# Patient Record
Sex: FEMALE | Race: WHITE | HISPANIC OR LATINO | Employment: FULL TIME | URBAN - METROPOLITAN AREA
[De-identification: names, ages, dates, MRNs, and addresses within clinical notes are randomized per-mention and may not be internally consistent; named-entity substitution may affect disease eponyms.]

---

## 2019-11-23 ENCOUNTER — APPOINTMENT (EMERGENCY)
Dept: RADIOLOGY | Facility: HOSPITAL | Age: 24
End: 2019-11-23
Payer: COMMERCIAL

## 2019-11-23 ENCOUNTER — HOSPITAL ENCOUNTER (EMERGENCY)
Facility: HOSPITAL | Age: 24
Discharge: HOME | End: 2019-11-23
Attending: EMERGENCY MEDICINE
Payer: COMMERCIAL

## 2019-11-23 VITALS
OXYGEN SATURATION: 99 % | DIASTOLIC BLOOD PRESSURE: 74 MMHG | HEART RATE: 78 BPM | WEIGHT: 198.19 LBS | RESPIRATION RATE: 16 BRPM | SYSTOLIC BLOOD PRESSURE: 111 MMHG | TEMPERATURE: 99 F

## 2019-11-23 DIAGNOSIS — R07.9 CHEST PAIN, UNSPECIFIED TYPE: Primary | ICD-10-CM

## 2019-11-23 DIAGNOSIS — R10.13 EPIGASTRIC PAIN: ICD-10-CM

## 2019-11-23 LAB
ALBUMIN SERPL BCP-MCNC: 3.5 G/DL (ref 3.5–5)
ALP SERPL-CCNC: 54 U/L (ref 46–116)
ALT SERPL W P-5'-P-CCNC: 15 U/L (ref 12–78)
ANION GAP SERPL CALCULATED.3IONS-SCNC: 3 MMOL/L (ref 4–13)
AST SERPL W P-5'-P-CCNC: 23 U/L (ref 5–45)
BACTERIA UR QL AUTO: ABNORMAL /HPF
BASOPHILS # BLD AUTO: 0.07 THOUSANDS/ΜL (ref 0–0.1)
BASOPHILS NFR BLD AUTO: 1 % (ref 0–1)
BILIRUB SERPL-MCNC: 0.25 MG/DL (ref 0.2–1)
BILIRUB UR QL STRIP: NEGATIVE
BUN SERPL-MCNC: 8 MG/DL (ref 5–25)
CALCIUM SERPL-MCNC: 8.7 MG/DL (ref 8.3–10.1)
CHLORIDE SERPL-SCNC: 104 MMOL/L (ref 100–108)
CLARITY UR: CLEAR
CO2 SERPL-SCNC: 29 MMOL/L (ref 21–32)
COLOR UR: YELLOW
CREAT SERPL-MCNC: 0.5 MG/DL (ref 0.6–1.3)
EOSINOPHIL # BLD AUTO: 0.13 THOUSAND/ΜL (ref 0–0.61)
EOSINOPHIL NFR BLD AUTO: 1 % (ref 0–6)
ERYTHROCYTE [DISTWIDTH] IN BLOOD BY AUTOMATED COUNT: 12.5 % (ref 11.6–15.1)
EXT PREG TEST URINE: NEGATIVE
EXT. CONTROL ED NAV: NORMAL
GFR SERPL CREATININE-BSD FRML MDRD: 136 ML/MIN/1.73SQ M
GLUCOSE SERPL-MCNC: 93 MG/DL (ref 65–140)
GLUCOSE UR STRIP-MCNC: NEGATIVE MG/DL
HCT VFR BLD AUTO: 38.1 % (ref 34.8–46.1)
HGB BLD-MCNC: 12.4 G/DL (ref 11.5–15.4)
HGB UR QL STRIP.AUTO: ABNORMAL
IMM GRANULOCYTES # BLD AUTO: 0.04 THOUSAND/UL (ref 0–0.2)
IMM GRANULOCYTES NFR BLD AUTO: 0 % (ref 0–2)
KETONES UR STRIP-MCNC: NEGATIVE MG/DL
LEUKOCYTE ESTERASE UR QL STRIP: ABNORMAL
LIPASE SERPL-CCNC: 127 U/L (ref 73–393)
LYMPHOCYTES # BLD AUTO: 3.2 THOUSANDS/ΜL (ref 0.6–4.47)
LYMPHOCYTES NFR BLD AUTO: 27 % (ref 14–44)
MCH RBC QN AUTO: 29.3 PG (ref 26.8–34.3)
MCHC RBC AUTO-ENTMCNC: 32.5 G/DL (ref 31.4–37.4)
MCV RBC AUTO: 90 FL (ref 82–98)
MONOCYTES # BLD AUTO: 0.87 THOUSAND/ΜL (ref 0.17–1.22)
MONOCYTES NFR BLD AUTO: 7 % (ref 4–12)
NEUTROPHILS # BLD AUTO: 7.43 THOUSANDS/ΜL (ref 1.85–7.62)
NEUTS SEG NFR BLD AUTO: 64 % (ref 43–75)
NITRITE UR QL STRIP: NEGATIVE
NON-SQ EPI CELLS URNS QL MICRO: ABNORMAL /HPF
NRBC BLD AUTO-RTO: 0 /100 WBCS
PH UR STRIP.AUTO: 7.5 [PH] (ref 4.5–8)
PLATELET # BLD AUTO: 264 THOUSANDS/UL (ref 149–390)
PMV BLD AUTO: 10.7 FL (ref 8.9–12.7)
POTASSIUM SERPL-SCNC: 4.8 MMOL/L (ref 3.5–5.3)
PROT SERPL-MCNC: 7.2 G/DL (ref 6.4–8.2)
PROT UR STRIP-MCNC: NEGATIVE MG/DL
RBC # BLD AUTO: 4.23 MILLION/UL (ref 3.81–5.12)
RBC #/AREA URNS AUTO: ABNORMAL /HPF
SODIUM SERPL-SCNC: 136 MMOL/L (ref 136–145)
SP GR UR STRIP.AUTO: 1.01 (ref 1–1.03)
UROBILINOGEN UR QL STRIP.AUTO: 0.2 E.U./DL
WBC # BLD AUTO: 11.74 THOUSAND/UL (ref 4.31–10.16)
WBC #/AREA URNS AUTO: ABNORMAL /HPF

## 2019-11-23 PROCEDURE — 80053 COMPREHEN METABOLIC PANEL: CPT | Performed by: EMERGENCY MEDICINE

## 2019-11-23 PROCEDURE — 85025 COMPLETE CBC W/AUTO DIFF WBC: CPT | Performed by: EMERGENCY MEDICINE

## 2019-11-23 PROCEDURE — 81025 URINE PREGNANCY TEST: CPT | Performed by: EMERGENCY MEDICINE

## 2019-11-23 PROCEDURE — 99284 EMERGENCY DEPT VISIT MOD MDM: CPT | Performed by: EMERGENCY MEDICINE

## 2019-11-23 PROCEDURE — 93005 ELECTROCARDIOGRAM TRACING: CPT

## 2019-11-23 PROCEDURE — 71046 X-RAY EXAM CHEST 2 VIEWS: CPT

## 2019-11-23 PROCEDURE — 81001 URINALYSIS AUTO W/SCOPE: CPT

## 2019-11-23 PROCEDURE — 83690 ASSAY OF LIPASE: CPT | Performed by: EMERGENCY MEDICINE

## 2019-11-23 PROCEDURE — 36415 COLL VENOUS BLD VENIPUNCTURE: CPT | Performed by: EMERGENCY MEDICINE

## 2019-11-23 PROCEDURE — 99285 EMERGENCY DEPT VISIT HI MDM: CPT

## 2019-11-23 RX ORDER — FAMOTIDINE 20 MG/1
20 TABLET, FILM COATED ORAL 2 TIMES DAILY
Qty: 20 TABLET | Refills: 0 | Status: SHIPPED | OUTPATIENT
Start: 2019-11-23 | End: 2019-12-03

## 2019-11-23 RX ORDER — MAGNESIUM HYDROXIDE/ALUMINUM HYDROXICE/SIMETHICONE 120; 1200; 1200 MG/30ML; MG/30ML; MG/30ML
30 SUSPENSION ORAL ONCE
Status: COMPLETED | OUTPATIENT
Start: 2019-11-23 | End: 2019-11-23

## 2019-11-23 RX ADMIN — ALUMINUM HYDROXIDE, MAGNESIUM HYDROXIDE, AND SIMETHICONE 30 ML: 200; 200; 20 SUSPENSION ORAL at 20:42

## 2019-11-24 LAB
ATRIAL RATE: 86 BPM
P AXIS: 64 DEGREES
PR INTERVAL: 170 MS
QRS AXIS: 68 DEGREES
QRSD INTERVAL: 78 MS
QT INTERVAL: 338 MS
QTC INTERVAL: 404 MS
T WAVE AXIS: 54 DEGREES
VENTRICULAR RATE: 86 BPM

## 2019-11-24 PROCEDURE — 93010 ELECTROCARDIOGRAM REPORT: CPT | Performed by: INTERNAL MEDICINE

## 2019-11-24 NOTE — ED PROVIDER NOTES
History  Chief Complaint   Patient presents with    Abdominal Pain     pt c/o upper abdominal pain and chest pain intermittent since this morning  denies n/v   states hard to breath  denies sick contacts   Chest Pain     80-year-old female with no pertinent past medical history presenting to the ED stating that she has been having intermittent chest pain for last 3 months and the reason she has come in today is because she is having some epigastric pain associated with it which he has not had before  Patient states she has mild constipation for which he is taking MiraLax  Patient denies fever, aches, chills, sore throat, cough, vomiting, diarrhea constipation dysuria, hematuria  Denies recent long plane ride/heart/bus rides, recent chemotherapy, surgery, hormone therapy, history of DVTs, a family history of blood clots, family history of heart disease  None       History reviewed  No pertinent past medical history  History reviewed  No pertinent surgical history  History reviewed  No pertinent family history  I have reviewed and agree with the history as documented  Social History     Tobacco Use    Smoking status: Never Smoker    Smokeless tobacco: Never Used   Substance Use Topics    Alcohol use: Never     Frequency: Never    Drug use: Yes     Types: Marijuana        Review of Systems   Constitutional: Negative  HENT: Negative  Eyes: Negative for photophobia and visual disturbance  Respiratory: Positive for chest tightness and shortness of breath  Negative for apnea, cough, choking, wheezing and stridor  Cardiovascular: Positive for chest pain  Negative for palpitations and leg swelling  Gastrointestinal: Positive for nausea  Negative for abdominal distention, abdominal pain, constipation, diarrhea and vomiting  Genitourinary: Negative  Musculoskeletal: Negative  Skin: Negative  Neurological: Negative          Physical Exam  ED Triage Vitals   Temperature Pulse Respirations Blood Pressure SpO2   11/23/19 1807 11/23/19 1807 11/23/19 1807 11/23/19 1807 11/23/19 1807   99 °F (37 2 °C) 88 16 125/70 99 %      Temp Source Heart Rate Source Patient Position - Orthostatic VS BP Location FiO2 (%)   11/23/19 1807 11/23/19 2018 11/23/19 2018 11/23/19 2018 --   Temporal Monitor Lying Left arm       Pain Score       11/23/19 1807       8             Orthostatic Vital Signs  Vitals:    11/23/19 1807 11/23/19 2018   BP: 125/70 111/74   Pulse: 88 78   Patient Position - Orthostatic VS:  Lying       Physical Exam   Constitutional: She is oriented to person, place, and time  She appears well-developed and well-nourished  Non-toxic appearance  She does not appear ill  No distress  HENT:   Head: Normocephalic and atraumatic  Right Ear: External ear normal    Left Ear: External ear normal    Nose: Nose normal    Mouth/Throat: Oropharynx is clear and moist  No oropharyngeal exudate  Eyes: Pupils are equal, round, and reactive to light  Conjunctivae and EOM are normal  Right eye exhibits no discharge  Left eye exhibits no discharge  No scleral icterus  Neck: Normal range of motion  Neck supple  No JVD present  No tracheal deviation present  Cardiovascular: Normal rate, regular rhythm, normal heart sounds and intact distal pulses  Exam reveals no gallop and no friction rub  No murmur heard  Pulmonary/Chest: Effort normal and breath sounds normal  No stridor  No respiratory distress  She has no wheezes  She has no rales  Abdominal: Soft  Normal appearance and bowel sounds are normal  She exhibits no distension and no mass  There is no hepatosplenomegaly  There is no tenderness  There is no rigidity, no rebound, no guarding, no CVA tenderness, no tenderness at McBurney's point and negative Hoff's sign  Musculoskeletal: Normal range of motion  She exhibits no edema, tenderness or deformity  Neurological: She is alert and oriented to person, place, and time   No cranial nerve deficit or sensory deficit  She exhibits normal muscle tone  Skin: Skin is warm and dry  No rash noted  She is not diaphoretic  No erythema  No pallor  Psychiatric: She has a normal mood and affect  Her behavior is normal  Judgment and thought content normal    Nursing note and vitals reviewed        ED Medications  Medications   aluminum-magnesium hydroxide-simethicone (MYLANTA) 200-200-20 mg/5 mL oral suspension 30 mL (30 mL Oral Given 11/23/19 2042)       Diagnostic Studies  Results Reviewed     Procedure Component Value Units Date/Time    Comprehensive metabolic panel [[id number]]  (Abnormal) Collected:  11/23/19 1955    Lab Status:  Final result Specimen:  Blood from Arm, Left Updated:  11/23/19 2017     Sodium 136 mmol/L      Potassium 4 8 mmol/L      Chloride 104 mmol/L      CO2 29 mmol/L      ANION GAP 3 mmol/L      BUN 8 mg/dL      Creatinine 0 50 mg/dL      Glucose 93 mg/dL      Calcium 8 7 mg/dL      AST 23 U/L      ALT 15 U/L      Alkaline Phosphatase 54 U/L      Total Protein 7 2 g/dL      Albumin 3 5 g/dL      Total Bilirubin 0 25 mg/dL      eGFR 136 ml/min/1 73sq m     Narrative:       [place] guidelines for Chronic Kidney Disease (CKD):     Stage 1 with normal or high GFR (GFR > 90 mL/min/1 73 square meters)    Stage 2 Mild CKD (GFR = 60-89 mL/min/1 73 square meters)    Stage 3A Moderate CKD (GFR = 45-59 mL/min/1 73 square meters)    Stage 3B Moderate CKD (GFR = 30-44 mL/min/1 73 square meters)    Stage 4 Severe CKD (GFR = 15-29 mL/min/1 73 square meters)    Stage 5 End Stage CKD (GFR <15 mL/min/1 73 square meters)  Note: GFR calculation is accurate only with a steady state creatinine    Lipase [[id number]]  (Normal) Collected:  11/23/19 1955    Lab Status:  Final result Specimen:  Blood from Arm, Left Updated:  11/23/19 2017     Lipase 127 u/L     CBC and differential [[id number]]  (Abnormal) Collected:  11/23/19 1955    Lab Status:  Final result Specimen: Blood from Arm, Left Updated:  11/23/19 2003     WBC 11 74 Thousand/uL      RBC 4 23 Million/uL      Hemoglobin 12 4 g/dL      Hematocrit 38 1 %      MCV 90 fL      MCH 29 3 pg      MCHC 32 5 g/dL      RDW 12 5 %      MPV 10 7 fL      Platelets 127 Thousands/uL      nRBC 0 /100 WBCs      Neutrophils Relative 64 %      Immat GRANS % 0 %      Lymphocytes Relative 27 %      Monocytes Relative 7 %      Eosinophils Relative 1 %      Basophils Relative 1 %      Neutrophils Absolute 7 43 Thousands/µL      Immature Grans Absolute 0 04 Thousand/uL      Lymphocytes Absolute 3 20 Thousands/µL      Monocytes Absolute 0 87 Thousand/µL      Eosinophils Absolute 0 13 Thousand/µL      Basophils Absolute 0 07 Thousands/µL     Urine Microscopic [[id number]]  (Abnormal) Collected:  11/23/19 1831    Lab Status:  Final result Specimen:  Urine, Clean Catch Updated:  11/23/19 1907     RBC, UA None Seen /hpf      WBC, UA 4-10 /hpf      Epithelial Cells Occasional /hpf      Bacteria, UA Occasional /hpf     POCT urinalysis dipstick [[id number]]  (Abnormal) Resulted:  11/23/19 1836    Lab Status:  Final result Updated:  11/23/19 1836    POCT pregnancy, urine [[id number]]  (Normal) Resulted:  11/23/19 1835    Lab Status:  Final result Updated:  11/23/19 1836     EXT PREG TEST UR (Ref: Negative) negative     Control valid    Urine Macroscopic, POC [[id number]]  (Abnormal) Collected:  11/23/19 1831    Lab Status:  Final result Specimen:  Urine Updated:  11/23/19 1832     Color, UA Yellow     Clarity, UA Clear     pH, UA 7 5     Leukocytes, UA Small     Nitrite, UA Negative     Protein, UA Negative mg/dl      Glucose, UA Negative mg/dl      Ketones, UA Negative mg/dl      Urobilinogen, UA 0 2 E U /dl      Bilirubin, UA Negative     Blood, UA Trace     Specific [place], UA 1 015    Narrative:       CLINITEK RESULT                 XR chest pa & lateral    (Results Pending)         Procedures  ECG 12 Lead Documentation Only  Date/Time: 11/23/2019 8:18 PM  Performed by: Nayely García DO  Authorized by: Nayely García DO     Indications / Diagnosis:  CP  ECG reviewed by me, the ED Provider: yes    Patient location:  ED  Previous ECG:     Previous ECG:  Unavailable    Comparison to cardiac monitor: Yes    Interpretation:     Interpretation: normal    Rate:     ECG rate:  86    ECG rate assessment: normal    Rhythm:     Rhythm: sinus rhythm    Ectopy:     Ectopy: none    QRS:     QRS axis:  Normal    QRS intervals:  Normal  Conduction:     Conduction: normal    ST segments:     ST segments:  Normal  T waves:     T waves: normal              ED Course  ED Course as of Nov 23 2115   Sat Nov 23, 2019 2109 Feels better              HEART Risk Score      Most Recent Value   History  0 Filed at: 11/23/2019 2108   ECG  0 Filed at: 11/23/2019 2108   Age  0 Filed at: 11/23/2019 2108   Risk Factors  0 Filed at: 11/23/2019 2108   Troponin  0 Filed at: 11/23/2019 2108   Heart Score Risk Calculator   History  0 Filed at: 11/23/2019 2108   ECG  0 Filed at: 11/23/2019 2108   Age  0 Filed at: 11/23/2019 2108   Risk Factors  0 Filed at: 11/23/2019 2108   Troponin  0 Filed at: 11/23/2019 2108   HEART Score  0 Filed at: 11/23/2019 2108   HEART Score  0 Filed at: 11/23/2019 2108            PERC Rule for PE      Most Recent Value   PERC Rule for PE   Age >=50  0 Filed at: 11/23/2019 2108   HR >=100  0 Filed at: 11/23/2019 2108   O2 Sat on room air < 95%  0 Filed at: 11/23/2019 2108   History of PE or DVT  0 Filed at: 11/23/2019 2108   Recent trauma or surgery  0 Filed at: 11/23/2019 2108   Hemoptysis  0 Filed at: 11/23/2019 2108   Exogenous estrogen  0 Filed at: 11/23/2019 2108   Unilateral leg swelling  0 Filed at: 11/23/2019 2108   [address]  Rule for PE Results  0 Filed at: 11/23/2019 2108                      MDM      Disposition  Final diagnoses:   Chest pain, unspecified type   Epigastric pain     Time reflects when diagnosis was documented in both MDM as applicable and the Disposition within this note     Time User Action Codes Description Comment    11/23/2019  9:08 PM Joan Verde Add [R07 9] Chest pain, unspecified type     11/23/2019  9:08 PM Joan Verde Add [R10 13] Epigastric pain       ED Disposition     ED Disposition Condition Date/Time Comment    Discharge Stable Sat Nov 23, 2019  9:08 PM Yumiko Oden discharge to home/self care  Follow-up Information     Follow up With Specialties Details Why Contact Info Additional [facility] Family Medicine Go to   [address] [place] [id number]  [address] [place] [address],[address], [id number] [address] [facility] Emergency Department Emergency Medicine  As needed, If symptoms worsen [facility] [id number]  [place] [address] ED, [address] [place], [place], [address]          Patient's Medications   Discharge Prescriptions    FAMOTIDINE (PEPCID) 20 MG TABLET    Take 1 tablet (20 mg total) by mouth 2 (two) times a day for 10 days       Start Date: 11/23/2019End Date: 12/3/2019       Order Dose: 20 mg       Quantity: 20 tablet    Refills: 0     No discharge procedures on file  ED Provider  Attending physically available and evaluated Yumiko Oden I managed the patient along with the ED Attending      Electronically Signed by         Sue Mosquera DO  11/23/19 7410

## 2019-11-24 NOTE — ED ATTENDING ATTESTATION
11/23/2019  ILaura DO, saw and evaluated the patient  I have discussed the patient with the resident/non-physician practitioner and agree with the resident's/non-physician practitioner's findings, Plan of Care, and MDM as documented in the resident's/non-physician practitioner's note, except where noted  All available labs and Radiology studies were reviewed  I was present for key portions of any procedure(s) performed by the resident/non-physician practitioner and I was immediately available to provide assistance  At this point I agree with the current assessment done in the Emergency Department  I have conducted an independent evaluation of this patient a history and physical is as follows:    ED Course     25 y o  F p/w chest tightness x 3 months  Centralized chest tightness  Came in today because now she's having epigastric burning after eating breakfast  Also having "a little bit of constipation "  Took Miralax without relief  Denies fevers, cough, N/V/D, urinary complaints  No fhx cardiac problems  Pt in NAD on exam   Lungs CTAB  Heart RRR  Abdomen is nontender  Plan: Labs, CXR, EKG, symptomatic tx      Critical Care Time  Procedures

## 2021-12-30 ENCOUNTER — OFFICE VISIT (OUTPATIENT)
Dept: URGENT CARE | Age: 26
End: 2021-12-30
Payer: COMMERCIAL

## 2021-12-30 VITALS — TEMPERATURE: 100.2 F | WEIGHT: 230 LBS | HEART RATE: 128 BPM | OXYGEN SATURATION: 99 %

## 2021-12-30 DIAGNOSIS — J06.9 VIRAL URI: Primary | ICD-10-CM

## 2021-12-30 PROCEDURE — 99213 OFFICE O/P EST LOW 20 MIN: CPT | Performed by: FAMILY MEDICINE

## 2021-12-30 PROCEDURE — 87636 SARSCOV2 & INF A&B AMP PRB: CPT | Performed by: FAMILY MEDICINE

## 2021-12-30 RX ORDER — COPPER 313.4 MG/1
1 INTRAUTERINE DEVICE INTRAUTERINE
COMMUNITY

## 2022-01-04 LAB
FLUAV RNA RESP QL NAA+PROBE: NEGATIVE
FLUBV RNA RESP QL NAA+PROBE: NEGATIVE
SARS-COV-2 RNA RESP QL NAA+PROBE: POSITIVE

## 2022-01-05 ENCOUNTER — TELEPHONE (OUTPATIENT)
Dept: OTHER | Facility: OTHER | Age: 27
End: 2022-01-05

## 2022-09-28 ENCOUNTER — OFFICE VISIT (OUTPATIENT)
Dept: URGENT CARE | Age: 27
End: 2022-09-28
Payer: COMMERCIAL

## 2022-09-28 VITALS
RESPIRATION RATE: 20 BRPM | HEIGHT: 63 IN | BODY MASS INDEX: 41.36 KG/M2 | WEIGHT: 233.4 LBS | OXYGEN SATURATION: 97 % | SYSTOLIC BLOOD PRESSURE: 122 MMHG | HEART RATE: 103 BPM | TEMPERATURE: 99.2 F | DIASTOLIC BLOOD PRESSURE: 86 MMHG

## 2022-09-28 DIAGNOSIS — R09.82 POST-NASAL DRIP: ICD-10-CM

## 2022-09-28 DIAGNOSIS — J32.9 VIRAL SINUSITIS: ICD-10-CM

## 2022-09-28 DIAGNOSIS — R11.2 NAUSEA AND VOMITING, UNSPECIFIED VOMITING TYPE: Primary | ICD-10-CM

## 2022-09-28 DIAGNOSIS — R53.83 OTHER FATIGUE: ICD-10-CM

## 2022-09-28 DIAGNOSIS — B97.89 VIRAL SINUSITIS: ICD-10-CM

## 2022-09-28 LAB
SARS-COV-2 AG UPPER RESP QL IA: NEGATIVE
VALID CONTROL: NORMAL

## 2022-09-28 PROCEDURE — 99213 OFFICE O/P EST LOW 20 MIN: CPT | Performed by: NURSE PRACTITIONER

## 2022-09-28 PROCEDURE — 87811 SARS-COV-2 COVID19 W/OPTIC: CPT | Performed by: NURSE PRACTITIONER

## 2022-09-28 RX ORDER — ONDANSETRON 4 MG/1
4 TABLET, ORALLY DISINTEGRATING ORAL EVERY 8 HOURS PRN
Qty: 12 TABLET | Refills: 0 | Status: SHIPPED | OUTPATIENT
Start: 2022-09-28 | End: 2022-09-29

## 2022-09-28 NOTE — LETTER
September 28, 2022     Patient: General Hunt  YOB: 1995  Date of Visit: 9/28/2022      To Whom it May Concern:    General Hunt is under my professional care  Loy Moya was seen in my office on 9/28/2022  Loy Moya may return to work on 09/29/2022 if symptoms improve, if not please excuse till the next working day              Sincerely,          RACIEL Blank        CC: No Recipients

## 2022-09-28 NOTE — PATIENT INSTRUCTIONS
Take zyrtec or allegra daily  Use flonase 1-2 sprays in each nare daily   Use nasal saline to the nose,   Use humidifer in room  Symptoms worsen go to ER  Rest    Covid test is negative today in the office    Aware to use zofran for the nausea     No bacterial infection present

## 2022-09-28 NOTE — PROGRESS NOTES
NAME: Tyler Harris is a 32 y o  female  : 1995    MRN: [id number]    /86   Pulse 103   Temp 99 2 °F (37 3 °C)   Resp 20   Ht 5' 3" (1 6 m)   Wt 106 kg (233 lb 6 4 oz)   LMP 2022 (Approximate)   SpO2 97%   BMI 41 34 kg/m²     Assessment and Plan   Nausea and vomiting, unspecified vomiting type [R11 2]  1  Nausea and vomiting, unspecified vomiting type  Poct Covid 19 Rapid Antigen Test    ondansetron (ZOFRAN-ODT) 4 mg disintegrating tablet   2  Viral sinusitis     3  Post-nasal drip     4  Other fatigue         Fredo Sotelo was seen today for cold like symptoms  Diagnoses and all orders for this visit:    Nausea and vomiting, unspecified vomiting type  -     Poct Covid 19 Rapid Antigen Test  -     ondansetron (ZOFRAN-ODT) 4 mg disintegrating tablet; Take 1 tablet (4 mg total) by mouth every 8 (eight) hours as needed for nausea or vomiting for up to 12 doses    Viral sinusitis    Post-nasal drip    Other fatigue        Patient Instructions   Patient Instructions   Take zyrtec or allegra daily  Use flonase 1-2 sprays in each nare daily   Use nasal saline to the nose,   Use humidifer in room  Symptoms worsen go to ER  Rest    Covid test is negative today in the office    Aware to use zofran for the nausea     No bacterial infection present      Proceed to the nearest ER if symptoms worsen, Follow up with your PCP  Continue to social distance, wash your hands, and wear your masks  Please continue to follow the CDC  gov guidelines daily for they are subject to change on COVID-19    Chief Complaint     Chief Complaint   Patient presents with    Cold Like Symptoms     Pt started Sat with nasal congestion and cough, yesterday with vomiting and last cristine with diarrhea, chills, sweats and weakness  Pt works in   No OTC meds taken  No rapid covid test performed, pt is unvaccinated             History of Present Illness     Patient is a 51-year-old female who is here today with fatigue, nausea vomiting, sore throat  Symptoms have been present for the past 4 days  She is eating nothing over-the-counter currently works in a  and is not vaccinated for COVID-19  She has not taken any home COVID test prior to arrival, denies fevers, and has used her albuterol inhaler twice  She has a history of asthma and typically has no shortness of breath or chest pain  Denies chest pain today as well  Denies abdominal cramping      Review of Systems   Review of Systems   Constitutional: Positive for fatigue  Negative for chills and fever  HENT: Positive for postnasal drip, sneezing and sore throat  Negative for congestion, ear pain, rhinorrhea, sinus pressure and sinus pain  Eyes: Negative  Respiratory: Positive for cough  Negative for shortness of breath  Cardiovascular: Negative  Gastrointestinal: Positive for diarrhea, nausea and vomiting  Genitourinary: Negative  Musculoskeletal: Negative  Skin: Negative  Neurological: Negative  Psychiatric/Behavioral: Negative  Current Medications       Current Outpatient Medications:     intrauterine copper (PARAGARD) IUD, 1 each by Intrauterine route, Disp: , Rfl:     ondansetron (ZOFRAN-ODT) 4 mg disintegrating tablet, Take 1 tablet (4 mg total) by mouth every 8 (eight) hours as needed for nausea or vomiting for up to 12 doses, Disp: 12 tablet, Rfl: 0    famotidine (PEPCID) 20 mg tablet, Take 1 tablet (20 mg total) by mouth 2 (two) times a day for 10 days, Disp: 20 tablet, Rfl: 0    Prenatal Multivit-Min-Fe-FA (PRENATAL 1 + IRON PO), Take 1 tablet by mouth daily (Patient not taking: Reported on 9/28/2022), Disp: , Rfl:     Current Allergies     Allergies as of 09/28/2022    (No Known Allergies)              History reviewed  No pertinent past medical history  History reviewed  No pertinent surgical history  History reviewed  No pertinent family history  Medications have been verified      The following portions of the patient's history were reviewed and updated as appropriate: allergies, current medications, past family history, past medical history, past social history, past surgical history and problem list     Objective   /86   Pulse 103   Temp 99 2 °F (37 3 °C)   Resp 20   Ht 5' 3" (1 6 m)   Wt 106 kg (233 lb 6 4 oz)   LMP 09/05/2022 (Approximate)   SpO2 97%   BMI 41 34 kg/m²      Physical Exam     Physical Exam  Constitutional:       General: She is awake  She is not in acute distress  Appearance: Normal appearance  She is well-developed  She is not ill-appearing  HENT:      Head: Normocephalic  Right Ear: Hearing, tympanic membrane, ear canal and external ear normal  There is no impacted cerumen  Left Ear: Hearing, tympanic membrane, ear canal and external ear normal  There is no impacted cerumen  Nose: Rhinorrhea present  No mucosal edema or congestion  Right Turbinates: Swollen and pale  Left Turbinates: Swollen and pale  Right Sinus: No maxillary sinus tenderness  Left Sinus: No maxillary sinus tenderness  Mouth/Throat:      Lips: Pink  Mouth: Mucous membranes are moist       Pharynx: Oropharynx is clear  Uvula midline  No pharyngeal swelling, oropharyngeal exudate, posterior oropharyngeal erythema or uvula swelling  Tonsils: No tonsillar exudate  Comments: Clear drainage in back of throat,  Eyes:      General: Lids are normal       Extraocular Movements: Extraocular movements intact  Pupils: Pupils are equal, round, and reactive to light  Cardiovascular:      Rate and Rhythm: Normal rate and regular rhythm  Heart sounds: Normal heart sounds  Pulmonary:      Effort: Pulmonary effort is normal       Breath sounds: Normal breath sounds and air entry  No decreased breath sounds, wheezing, rhonchi or rales  Skin:     General: Skin is warm  Capillary Refill: Capillary refill takes less than 2 seconds     Neurological:      General: No focal deficit present  Mental Status: She is alert and oriented to person, place, and time  Psychiatric:         Attention and Perception: Attention normal          Mood and Affect: Mood normal          Behavior: Behavior is cooperative  Note: Portions of this record may have been created with voice recognition software  Occasional wrong word or "sound a like" substitutions may have occurred due to the inherent limitations of voice recognition software  Please read the chart carefully and recognize, using context, where substitutions have occurred  RACIEL Ann

## 2022-09-28 NOTE — LETTER
September 28, 2022     Patient: Shiloh Ivan  YOB: 1995  Date of Visit: 9/28/2022      To Whom it May Concern:    Shiloh Ivan is under my professional care  Nayelyemily Dye was seen in my office on 9/28/2022  Nayely Dye may return to work on 9/28/2022  if symptoms improve, if not please excuse till the next working day  She tested negative for COVID today in the office              Sincerely,          RACIEL Shay        CC: No Recipients

## 2022-09-29 ENCOUNTER — APPOINTMENT (EMERGENCY)
Dept: CT IMAGING | Facility: HOSPITAL | Age: 27
End: 2022-09-29
Payer: COMMERCIAL

## 2022-09-29 ENCOUNTER — APPOINTMENT (OUTPATIENT)
Dept: RADIOLOGY | Facility: HOSPITAL | Age: 27
End: 2022-09-29
Payer: COMMERCIAL

## 2022-09-29 ENCOUNTER — HOSPITAL ENCOUNTER (EMERGENCY)
Facility: HOSPITAL | Age: 27
Discharge: HOME | End: 2022-09-29
Attending: EMERGENCY MEDICINE
Payer: COMMERCIAL

## 2022-09-29 VITALS
RESPIRATION RATE: 22 BRPM | HEART RATE: 95 BPM | DIASTOLIC BLOOD PRESSURE: 62 MMHG | TEMPERATURE: 99.6 F | OXYGEN SATURATION: 99 % | BODY MASS INDEX: 41.24 KG/M2 | WEIGHT: 232.81 LBS | SYSTOLIC BLOOD PRESSURE: 106 MMHG

## 2022-09-29 DIAGNOSIS — N39.0 UTI (URINARY TRACT INFECTION): ICD-10-CM

## 2022-09-29 DIAGNOSIS — K52.9 GASTROENTERITIS: ICD-10-CM

## 2022-09-29 DIAGNOSIS — D21.9 FIBROID: ICD-10-CM

## 2022-09-29 DIAGNOSIS — B34.9 ACUTE VIRAL SYNDROME: Primary | ICD-10-CM

## 2022-09-29 LAB
ALBUMIN SERPL BCP-MCNC: 3.5 G/DL (ref 3.5–5)
ALP SERPL-CCNC: 81 U/L (ref 46–116)
ALT SERPL W P-5'-P-CCNC: 16 U/L (ref 12–78)
ANION GAP SERPL CALCULATED.3IONS-SCNC: 7 MMOL/L (ref 4–13)
AST SERPL W P-5'-P-CCNC: 23 U/L (ref 5–45)
BACTERIA UR QL AUTO: ABNORMAL /HPF
BASOPHILS # BLD AUTO: 0.03 THOUSANDS/ΜL (ref 0–0.1)
BASOPHILS NFR BLD AUTO: 0 % (ref 0–1)
BILIRUB SERPL-MCNC: 0.22 MG/DL (ref 0.2–1)
BILIRUB UR QL STRIP: NEGATIVE
BUN SERPL-MCNC: 5 MG/DL (ref 5–25)
CALCIUM SERPL-MCNC: 8.8 MG/DL (ref 8.3–10.1)
CARDIAC TROPONIN I PNL SERPL HS: <2 NG/L
CARDIAC TROPONIN I PNL SERPL HS: <2 NG/L
CHLORIDE SERPL-SCNC: 99 MMOL/L (ref 96–108)
CLARITY UR: CLEAR
CO2 SERPL-SCNC: 29 MMOL/L (ref 21–32)
COLOR UR: YELLOW
CREAT SERPL-MCNC: 0.65 MG/DL (ref 0.6–1.3)
EOSINOPHIL # BLD AUTO: 0 THOUSAND/ΜL (ref 0–0.61)
EOSINOPHIL NFR BLD AUTO: 0 % (ref 0–6)
ERYTHROCYTE [DISTWIDTH] IN BLOOD BY AUTOMATED COUNT: 13.3 % (ref 11.6–15.1)
EXT PREG TEST URINE: NEGATIVE
EXT. CONTROL ED NAV: NORMAL
GFR SERPL CREATININE-BSD FRML MDRD: 122 ML/MIN/1.73SQ M
GLUCOSE SERPL-MCNC: 106 MG/DL (ref 65–140)
GLUCOSE UR STRIP-MCNC: NEGATIVE MG/DL
HCT VFR BLD AUTO: 43.3 % (ref 34.8–46.1)
HGB BLD-MCNC: 14 G/DL (ref 11.5–15.4)
HGB UR QL STRIP.AUTO: ABNORMAL
IMM GRANULOCYTES # BLD AUTO: 0.05 THOUSAND/UL (ref 0–0.2)
IMM GRANULOCYTES NFR BLD AUTO: 1 % (ref 0–2)
KETONES UR STRIP-MCNC: ABNORMAL MG/DL
LEUKOCYTE ESTERASE UR QL STRIP: ABNORMAL
LIPASE SERPL-CCNC: 80 U/L (ref 73–393)
LYMPHOCYTES # BLD AUTO: 0.93 THOUSANDS/ΜL (ref 0.6–4.47)
LYMPHOCYTES NFR BLD AUTO: 11 % (ref 14–44)
MCH RBC QN AUTO: 27.8 PG (ref 26.8–34.3)
MCHC RBC AUTO-ENTMCNC: 32.3 G/DL (ref 31.4–37.4)
MCV RBC AUTO: 86 FL (ref 82–98)
MONOCYTES # BLD AUTO: 0.55 THOUSAND/ΜL (ref 0.17–1.22)
MONOCYTES NFR BLD AUTO: 6 % (ref 4–12)
NEUTROPHILS # BLD AUTO: 7.13 THOUSANDS/ΜL (ref 1.85–7.62)
NEUTS SEG NFR BLD AUTO: 82 % (ref 43–75)
NITRITE UR QL STRIP: NEGATIVE
NON-SQ EPI CELLS URNS QL MICRO: ABNORMAL /HPF
NRBC BLD AUTO-RTO: 0 /100 WBCS
PH UR STRIP.AUTO: 6.5 [PH] (ref 4.5–8)
PLATELET # BLD AUTO: 282 THOUSANDS/UL (ref 149–390)
PMV BLD AUTO: 10.9 FL (ref 8.9–12.7)
POTASSIUM SERPL-SCNC: 4.1 MMOL/L (ref 3.5–5.3)
PROT SERPL-MCNC: 8.2 G/DL (ref 6.4–8.4)
PROT UR STRIP-MCNC: ABNORMAL MG/DL
RBC # BLD AUTO: 5.03 MILLION/UL (ref 3.81–5.12)
RBC #/AREA URNS AUTO: ABNORMAL /HPF
SARS-COV-2 RNA RESP QL NAA+PROBE: NEGATIVE
SODIUM SERPL-SCNC: 135 MMOL/L (ref 135–147)
SP GR UR STRIP.AUTO: 1.02 (ref 1–1.03)
UROBILINOGEN UR QL STRIP.AUTO: 0.2 E.U./DL
WBC # BLD AUTO: 8.69 THOUSAND/UL (ref 4.31–10.16)
WBC #/AREA URNS AUTO: ABNORMAL /HPF

## 2022-09-29 PROCEDURE — 80053 COMPREHEN METABOLIC PANEL: CPT | Performed by: PHYSICIAN ASSISTANT

## 2022-09-29 PROCEDURE — 81001 URINALYSIS AUTO W/SCOPE: CPT

## 2022-09-29 PROCEDURE — 96374 THER/PROPH/DIAG INJ IV PUSH: CPT

## 2022-09-29 PROCEDURE — G1004 CDSM NDSC: HCPCS

## 2022-09-29 PROCEDURE — 74177 CT ABD & PELVIS W/CONTRAST: CPT

## 2022-09-29 PROCEDURE — U0005 INFEC AGEN DETEC AMPLI PROBE: HCPCS | Performed by: PHYSICIAN ASSISTANT

## 2022-09-29 PROCEDURE — 84484 ASSAY OF TROPONIN QUANT: CPT | Performed by: PHYSICIAN ASSISTANT

## 2022-09-29 PROCEDURE — 96375 TX/PRO/DX INJ NEW DRUG ADDON: CPT

## 2022-09-29 PROCEDURE — 83690 ASSAY OF LIPASE: CPT | Performed by: PHYSICIAN ASSISTANT

## 2022-09-29 PROCEDURE — 99285 EMERGENCY DEPT VISIT HI MDM: CPT | Performed by: PHYSICIAN ASSISTANT

## 2022-09-29 PROCEDURE — U0003 INFECTIOUS AGENT DETECTION BY NUCLEIC ACID (DNA OR RNA); SEVERE ACUTE RESPIRATORY SYNDROME CORONAVIRUS 2 (SARS-COV-2) (CORONAVIRUS DISEASE [COVID-19]), AMPLIFIED PROBE TECHNIQUE, MAKING USE OF HIGH THROUGHPUT TECHNOLOGIES AS DESCRIBED BY CMS-2020-01-R: HCPCS | Performed by: PHYSICIAN ASSISTANT

## 2022-09-29 PROCEDURE — 96361 HYDRATE IV INFUSION ADD-ON: CPT

## 2022-09-29 PROCEDURE — NC001 PR NO CHARGE: Performed by: PHYSICIAN ASSISTANT

## 2022-09-29 PROCEDURE — 85025 COMPLETE CBC W/AUTO DIFF WBC: CPT | Performed by: PHYSICIAN ASSISTANT

## 2022-09-29 PROCEDURE — 81025 URINE PREGNANCY TEST: CPT | Performed by: PHYSICIAN ASSISTANT

## 2022-09-29 PROCEDURE — 99284 EMERGENCY DEPT VISIT MOD MDM: CPT

## 2022-09-29 PROCEDURE — 93005 ELECTROCARDIOGRAM TRACING: CPT

## 2022-09-29 PROCEDURE — 71045 X-RAY EXAM CHEST 1 VIEW: CPT

## 2022-09-29 PROCEDURE — 36415 COLL VENOUS BLD VENIPUNCTURE: CPT | Performed by: PHYSICIAN ASSISTANT

## 2022-09-29 PROCEDURE — 87086 URINE CULTURE/COLONY COUNT: CPT

## 2022-09-29 RX ORDER — ONDANSETRON 4 MG/1
4 TABLET, ORALLY DISINTEGRATING ORAL EVERY 8 HOURS PRN
Qty: 12 TABLET | Refills: 0 | Status: SHIPPED | OUTPATIENT
Start: 2022-09-29

## 2022-09-29 RX ORDER — DICYCLOMINE HCL 20 MG
20 TABLET ORAL ONCE
Status: COMPLETED | OUTPATIENT
Start: 2022-09-29 | End: 2022-09-29

## 2022-09-29 RX ORDER — ONDANSETRON 2 MG/ML
4 INJECTION INTRAMUSCULAR; INTRAVENOUS ONCE
Status: COMPLETED | OUTPATIENT
Start: 2022-09-29 | End: 2022-09-29

## 2022-09-29 RX ORDER — KETOROLAC TROMETHAMINE 30 MG/ML
15 INJECTION, SOLUTION INTRAMUSCULAR; INTRAVENOUS ONCE
Status: COMPLETED | OUTPATIENT
Start: 2022-09-29 | End: 2022-09-29

## 2022-09-29 RX ORDER — CEPHALEXIN 500 MG/1
500 CAPSULE ORAL 2 TIMES DAILY
Qty: 20 CAPSULE | Refills: 0 | Status: SHIPPED | OUTPATIENT
Start: 2022-09-29 | End: 2022-10-09

## 2022-09-29 RX ORDER — DICYCLOMINE HCL 20 MG
20 TABLET ORAL EVERY 6 HOURS
Qty: 30 TABLET | Refills: 0 | Status: SHIPPED | OUTPATIENT
Start: 2022-09-29

## 2022-09-29 RX ADMIN — KETOROLAC TROMETHAMINE 15 MG: 30 INJECTION, SOLUTION INTRAMUSCULAR; INTRAVENOUS at 12:38

## 2022-09-29 RX ADMIN — SODIUM CHLORIDE 1000 ML: 0.9 INJECTION, SOLUTION INTRAVENOUS at 11:17

## 2022-09-29 RX ADMIN — DICYCLOMINE HYDROCHLORIDE 20 MG: 20 TABLET ORAL at 11:23

## 2022-09-29 RX ADMIN — IOHEXOL 100 ML: 350 INJECTION, SOLUTION INTRAVENOUS at 12:27

## 2022-09-29 RX ADMIN — ONDANSETRON 4 MG: 2 INJECTION INTRAMUSCULAR; INTRAVENOUS at 11:19

## 2022-09-29 NOTE — Clinical Note
Jose Ashley was seen and treated in our emergency department on 9/29/2022  No restrictions            Diagnosis:     Anne Marie Aiken  may return to work on return date  She may return on this date: 10/03/2022         If you have any questions or concerns, please don't hesitate to call        Marcelino Lee RN    ______________________________           _______________          _______________  Hospital Representative                              Date                                Time

## 2022-09-29 NOTE — ED NOTES
Warm blankets placed on patient at this time for patient comfort  Bereavement cart ordered at this time        Dennis Berry RN  09/29/22 [id number]

## 2022-09-29 NOTE — Clinical Note
Sharif De Jesus was seen and treated in our emergency department on 9/29/2022  No restrictions            Diagnosis:     Tammy Diamond  may return to work on return date  She may return on this date: 10/03/2022         If you have any questions or concerns, please don't hesitate to call        Bekah Mireles RN    ______________________________           _______________          _______________  Hospital Representative                              Date                                Time

## 2022-09-29 NOTE — ED PROVIDER NOTES
Emergency Department Sign Out Note        Sign out and transfer of care from [facility]  See Separate Emergency Department note  The patient, Nishant Dixon, was evaluated by the previous provider for vomiting/diarrhea  Workup Completed:  Results Reviewed     Procedure Component Value Units Date/Time    HS Troponin I 2hr [[id number]] Collected: 09/29/22 1500    Lab Status: Final result Specimen: Blood from Arm, Right Updated: 09/29/22 1529     hs TnI 2hr <2 ng/L      Delta 2hr hsTnI --    HS Troponin I 4hr [[id number]]     Lab Status: No result Specimen: Blood     HS Troponin 0hr (reflex protocol) [[id number]]  (Normal) Collected: 09/29/22 1242    Lab Status: Final result Specimen: Blood from Arm, Right Updated: 09/29/22 1318     hs TnI 0hr <2 ng/L     COVID only [[id number]]  (Normal) Collected: 09/29/22 1119    Lab Status: Final result Specimen: Nares from Nose Updated: 09/29/22 1230     SARS-CoV-2 Negative    Narrative:      FOR PEDIATRIC PATIENTS - copy/paste COVID Guidelines URL to browser: [email or web address]    SARS-CoV-2 assay is a Nucleic Acid Amplification assay intended for the  qualitative detection of nucleic acid from SARS-CoV-2 in nasopharyngeal  swabs  Results are for the presumptive identification of SARS-CoV-2 RNA  Positive results are indicative of infection with SARS-CoV-2, the virus  causing COVID-19, but do not rule out bacterial infection or co-infection  with other viruses  Laboratories within the [place] and its  territories are required to report all positive results to the appropriate  public health authorities  Negative results do not preclude SARS-CoV-2  infection and should not be used as the sole basis for treatment or other  patient management decisions  Negative results must be combined with  clinical observations, patient history, and epidemiological information    This test has not been FDA cleared or approved  This test has been authorized by FDA under an Emergency Use Authorization  (EUA)  This test is only authorized for the duration of time the  declaration that circumstances exist justifying the authorization of the  emergency use of an in vitro diagnostic tests for detection of SARS-CoV-2  virus and/or diagnosis of COVID-19 infection under section 564(b)(1) of  the Act, 21 U  S C  757MND-8(L)(6), unless the authorization is terminated  or revoked sooner  The test has been validated but independent review by FDA  and CLIA is pending  Test performed using [employer]: This RT-PCR assay targets N2,  a region unique to SARS-CoV-2  A conserved region in the E-gene was chosen  for pan-Sarbecovirus detection which includes SARS-CoV-2  According to CMS-2020-01-R, this platform meets the definition of high-throughput technology  Urine Microscopic [[id number]]  (Abnormal) Collected: 09/29/22 1127    Lab Status: Final result Specimen: Urine, Clean Catch Updated: 09/29/22 1207     RBC, UA 2-4 /hpf      WBC, UA 30-50 /hpf      Epithelial Cells Occasional /hpf      Bacteria, UA Moderate /hpf     Urine culture [[id number]] Collected: 09/29/22 1127    Lab Status:  In process Specimen: Urine, Clean Catch Updated: 09/29/22 1207    Comprehensive metabolic panel [[id number]] Collected: 09/29/22 1119    Lab Status: Final result Specimen: Blood from Arm, Right Updated: 09/29/22 1156     Sodium 135 mmol/L      Potassium 4 1 mmol/L      Chloride 99 mmol/L      CO2 29 mmol/L      ANION GAP 7 mmol/L      BUN 5 mg/dL      Creatinine 0 65 mg/dL      Glucose 106 mg/dL      Calcium 8 8 mg/dL      AST 23 U/L      ALT 16 U/L      Alkaline Phosphatase 81 U/L      Total Protein 8 2 g/dL      Albumin 3 5 g/dL      Total Bilirubin 0 22 mg/dL      eGFR 122 ml/min/1 73sq m     Narrative:      [place] guidelines for Chronic Kidney Disease (CKD):     Stage 1 with normal or high GFR (GFR > 90 mL/min/1 73 square meters)    Stage 2 Mild CKD (GFR = 60-89 mL/min/1 73 square meters)    Stage 3A Moderate CKD (GFR = 45-59 mL/min/1 73 square meters)    Stage 3B Moderate CKD (GFR = 30-44 mL/min/1 73 square meters)    Stage 4 Severe CKD (GFR = 15-29 mL/min/1 73 square meters)    Stage 5 End Stage CKD (GFR <15 mL/min/1 73 square meters)  Note: GFR calculation is accurate only with a steady state creatinine    Lipase [[id number]]  (Normal) Collected: 09/29/22 1119    Lab Status: Final result Specimen: Blood from Arm, Right Updated: 09/29/22 1156     Lipase 80 u/L     CBC and differential [[id number]]  (Abnormal) Collected: 09/29/22 1119    Lab Status: Final result Specimen: Blood from Arm, Right Updated: 09/29/22 1137     WBC 8 69 Thousand/uL      RBC 5 03 Million/uL      Hemoglobin 14 0 g/dL      Hematocrit 43 3 %      MCV 86 fL      MCH 27 8 pg      MCHC 32 3 g/dL      RDW 13 3 %      MPV 10 9 fL      Platelets 902 Thousands/uL      nRBC 0 /100 WBCs      Neutrophils Relative 82 %      Immat GRANS % 1 %      Lymphocytes Relative 11 %      Monocytes Relative 6 %      Eosinophils Relative 0 %      Basophils Relative 0 %      Neutrophils Absolute 7 13 Thousands/µL      Immature Grans Absolute 0 05 Thousand/uL      Lymphocytes Absolute 0 93 Thousands/µL      Monocytes Absolute 0 55 Thousand/µL      Eosinophils Absolute 0 00 Thousand/µL      Basophils Absolute 0 03 Thousands/µL     POCT pregnancy, urine [[id number]]  (Normal) Resulted: 09/29/22 1136    Lab Status: Final result Updated: 09/29/22 1137     EXT PREG TEST UR (Ref: Negative) NEGATIVE     Control VALID    Urine Macroscopic, POC [[id number]]  (Abnormal) Collected: 09/29/22 1127    Lab Status: Final result Specimen: Urine Updated: 09/29/22 1128     Color, UA Yellow     Clarity, UA Clear     pH, UA 6 5     Leukocytes, UA Moderate     Nitrite, UA Negative     Protein, UA Trace mg/dl      Glucose, UA Negative mg/dl      Ketones, UA Trace mg/dl      Urobilinogen, UA 0 2 E U /dl      Bilirubin, UA Negative     Occult Blood, UA Moderate     Specific [place], UA 1 020    Narrative:      CLINITEK RESULT        XR chest 1 view portable   ED Interpretation by Pretty Burden PA-C (09/29 1330)   No acute findings seen by me at this time  Final Result by Thierno Small MD (09/29 1458)      No acute cardiopulmonary disease  Workstation performed: [id number]         CT abdomen pelvis with contrast   Final Result by Caro Bone MD (09/29 1317)      No appendicitis, obstructive uropathy or signs of cholecystitis in this patient with right sided pain  Numerous small scattered mesenteric lymph nodes are likely present on a reactive basis  Exophytic right fundal fibroid                    Workstation performed: [id number]               ED Course / Workup Pending (followup):  Pending delta troponin  HEART score = 0      HEART Risk Score    Flowsheet Row Most Recent Value   Heart Score Risk Calculator    History 0 Filed at: 09/29/2022 1542   ECG 0 Filed at: 09/29/2022 1542   Age 0 Filed at: 09/29/2022 1542   Risk Factors 0 Filed at: 09/29/2022 1542   Troponin 0 Filed at: 09/29/2022 1542   HEART Score 0 Filed at: 09/29/2022 1542                                  ED Course as of 09/29/22 1542   Thu Sep 29, 2022   1523 Sign out from [employer], pending delta troponin/EKG and dispo   1540 hs TnI 2hr: <2  Delta troponin     Procedures  MDM        Disposition  Final diagnoses:   Acute viral syndrome   Gastroenteritis   UTI (urinary tract infection)   Fibroid     Time reflects when diagnosis was documented in both MDM as applicable and the Disposition within this note     Time User Action Codes Description Comment    9/29/2022  3:07 PM Lakshmi Comp A Add [B34 9] Acute viral syndrome     9/29/2022  3:07 PM Lakshmi Comp A Add [R11 2] Nausea and vomiting     9/29/2022  3:07 PM Lakshmi Comp A Remove [R11 2] Nausea and vomiting     9/29/2022 3:07 PM Shainakira Orellana A Add [K52 9] Gastroenteritis     9/29/2022  3:07 PM Shaina Ling A Add [N39 0] UTI (urinary tract infection)     9/29/2022  3:08 PM Shaina Ling A Add [D21 9] Fibroid       ED Disposition     ED Disposition   Discharge    Condition   Stable    Date/Time   Thu Sep 29, 2022  3:41 PM    [address] [address] discharge to home/self care  Follow-up Information     Follow up With Specialties Details Why Contact Info Additional [address] [facility] Schedule an appointment as soon as possible for a visit  As needed [address], [address] [facility] [id number]  [address], [address], [address] [place], [place], [address] [place] [address] Obstetrics and Gynecology Schedule an appointment as soon as possible for a visit   [address] [facility] [address] [facility] [address]  [address], [address], [address] [place], [place], [address] [phone]        Patient's Medications   Discharge Prescriptions    CEPHALEXIN (KEFLEX) 500 MG CAPSULE    Take 1 capsule (500 mg total) by mouth 2 (two) times a day for 10 days       Start Date: 9/29/2022 End Date: 10/9/2022       Order Dose: 500 mg       Quantity: 20 capsule    Refills: 0    DICYCLOMINE (BENTYL) 20 MG TABLET    Take 1 tablet (20 mg total) by mouth every 6 (six) hours       Start Date: 9/29/2022 End Date: --       Order Dose: 20 mg       Quantity: 30 tablet    Refills: 0    ONDANSETRON (ZOFRAN-ODT) 4 MG DISINTEGRATING TABLET    Take 1 tablet (4 mg total) by mouth every 8 (eight) hours as needed for nausea or vomiting       Start Date: 9/29/2022 End Date: --       Order Dose: 4 mg       Quantity: 12 tablet    Refills: 0     No discharge procedures on file         ED Provider  Electronically Signed by     Radha Sanchez PA-C  09/29/22 5018

## 2022-09-29 NOTE — ED NOTES
When coming to room to reassess pt after antiemetic pt reported having chest pain and feeling shortness of breath  Vitals obtained, ECG performed  Pt also reports headache        Charu Lucio RN  09/29/22 7965

## 2022-09-29 NOTE — ED PROVIDER NOTES
History  Chief Complaint   Patient presents with    Vomiting     Pt states having constant vomiting/diarrhea for the past 3 days  Neg COVID test yesterday  Denies fevers but notes chills     27y  o female with no significant PMH presents to the ER for evaluation  Patient reports diffuse abdominal pain, non-bloody vomiting and non-bloody diarrhea for 3 days  Patient was seen yesterday for symptoms and was given Zofran  She has been taking Zofran without relief  She describes her pain as twisting and non-radiating  Symptoms are constant  She denies sick contacts or recent travel  Associated symptoms: URI symptoms  She denies fever, chest pain, dyspnea, urinary symptoms, weakness or paresthesias  History provided by:  Patient   used: No        Prior to Admission Medications   Prescriptions Last Dose Informant Patient Reported? Taking?   intrauterine copper (PARAGARD) IUD   Yes Yes   Si each by Intrauterine route      Facility-Administered Medications: None       History reviewed  No pertinent past medical history  History reviewed  No pertinent surgical history  History reviewed  No pertinent family history  I have reviewed and agree with the history as documented  E-Cigarette/Vaping     E-Cigarette/Vaping Substances     Social History     Tobacco Use    Smoking status: Never Smoker    Smokeless tobacco: Never Used   Substance Use Topics    Alcohol use: Never    Drug use: Yes     Types: Marijuana       Review of Systems   Constitutional: Negative for activity change, appetite change, chills and fever  HENT: Positive for congestion and rhinorrhea  Negative for drooling, ear discharge, ear pain, facial swelling and sore throat  Eyes: Negative for redness  Respiratory: Positive for cough  Negative for shortness of breath  Cardiovascular: Negative for chest pain  Gastrointestinal: Positive for abdominal pain, diarrhea, nausea and vomiting     Musculoskeletal: Negative for neck stiffness  Skin: Negative for rash  Allergic/Immunologic: Negative for food allergies  Neurological: Negative for weakness and numbness  Physical Exam  Physical Exam  Vitals and nursing note reviewed  Constitutional:       General: She is not in acute distress  Appearance: She is not toxic-appearing  HENT:      Head: Normocephalic and atraumatic  Eyes:      Conjunctiva/sclera: Conjunctivae normal    Neck:      Trachea: No tracheal deviation  Cardiovascular:      Rate and Rhythm: Normal rate and regular rhythm  Heart sounds: Normal heart sounds, S1 normal and S2 normal  No murmur heard  No friction rub  No gallop  Pulmonary:      Effort: Pulmonary effort is normal  No respiratory distress  Breath sounds: Normal breath sounds  No decreased breath sounds, wheezing, rhonchi or rales  Chest:      Chest wall: No tenderness  Abdominal:      General: Bowel sounds are normal  There is no distension  Palpations: Abdomen is soft  Tenderness: There is abdominal tenderness in the right upper quadrant and right lower quadrant  There is no guarding or rebound  Musculoskeletal:      Cervical back: Normal range of motion and neck supple  Skin:     General: Skin is warm and dry  Findings: No rash  Neurological:      Mental Status: She is alert  GCS: GCS eye subscore is 4  GCS verbal subscore is 5  GCS motor subscore is 6     Psychiatric:         Mood and Affect: Mood normal          Vital Signs  ED Triage Vitals   Temperature Pulse Respirations Blood Pressure SpO2   09/29/22 0941 09/29/22 0941 09/29/22 0941 09/29/22 0941 09/29/22 0941   99 6 °F (37 6 °C) 102 18 156/68 98 %      Temp Source Heart Rate Source Patient Position - Orthostatic VS BP Location FiO2 (%)   09/29/22 0941 09/29/22 0941 09/29/22 0941 09/29/22 0941 --   Oral Monitor Sitting Right arm       Pain Score       09/29/22 1123       8           Vitals:    09/29/22 1123 09/29/22 1124 09/29/22 1213 09/29/22 1501   BP:  121/56 112/58 106/62   Pulse: 101  93 95   Patient Position - Orthostatic VS:  Lying Lying Lying         Visual Acuity      ED Medications  Medications   sodium chloride 0 9 % bolus 1,000 mL (0 mL Intravenous Stopped 9/29/22 1347)   ondansetron (ZOFRAN) injection 4 mg (4 mg Intravenous Given 9/29/22 1119)   dicyclomine (BENTYL) tablet 20 mg (20 mg Oral Given 9/29/22 1123)   ketorolac (TORADOL) injection 15 mg (15 mg Intravenous Given 9/29/22 1238)   iohexol (OMNIPAQUE) 350 MG/ML injection (MULTI-DOSE) 100 mL (100 mL Intravenous Given 9/29/22 1227)       Diagnostic Studies  Results Reviewed     Procedure Component Value Units Date/Time    HS Troponin I 2hr [[id number]] Collected: 09/29/22 1500    Lab Status: In process Specimen: Blood from Arm, Right Updated: 09/29/22 1506    HS Troponin I 4hr [[id number]]     Lab Status: No result Specimen: Blood     HS Troponin 0hr (reflex protocol) [[id number]]  (Normal) Collected: 09/29/22 1242    Lab Status: Final result Specimen: Blood from Arm, Right Updated: 09/29/22 1318     hs TnI 0hr <2 ng/L     COVID only [[id number]]  (Normal) Collected: 09/29/22 1119    Lab Status: Final result Specimen: Nares from Nose Updated: 09/29/22 1230     SARS-CoV-2 Negative    Narrative:      FOR PEDIATRIC PATIENTS - copy/paste COVID Guidelines URL to browser: [email or web address]    SARS-CoV-2 assay is a Nucleic Acid Amplification assay intended for the  qualitative detection of nucleic acid from SARS-CoV-2 in nasopharyngeal  swabs  Results are for the presumptive identification of SARS-CoV-2 RNA  Positive results are indicative of infection with SARS-CoV-2, the virus  causing COVID-19, but do not rule out bacterial infection or co-infection  with other viruses  Laboratories within the [place] and its  territories are required to report all positive results to the appropriate  public health authorities  Negative results do not preclude SARS-CoV-2  infection and should not be used as the sole basis for treatment or other  patient management decisions  Negative results must be combined with  clinical observations, patient history, and epidemiological information  This test has not been FDA cleared or approved  This test has been authorized by FDA under an Emergency Use Authorization  (EUA)  This test is only authorized for the duration of time the  declaration that circumstances exist justifying the authorization of the  emergency use of an in vitro diagnostic tests for detection of SARS-CoV-2  virus and/or diagnosis of COVID-19 infection under section 564(b)(1) of  the Act, 21 U  S C  131WUH-0(N)(6), unless the authorization is terminated  or revoked sooner  The test has been validated but independent review by FDA  and CLIA is pending  Test performed using [employer] GeneXpert: This RT-PCR assay targets N2,  a region unique to SARS-CoV-2  A conserved region in the E-gene was chosen  for pan-Sarbecovirus detection which includes SARS-CoV-2  According to CMS-2020-01-R, this platform meets the definition of high-throughput technology  Urine Microscopic [[id number]]  (Abnormal) Collected: 09/29/22 1127    Lab Status: Final result Specimen: Urine, Clean Catch Updated: 09/29/22 1207     RBC, UA 2-4 /hpf      WBC, UA 30-50 /hpf      Epithelial Cells Occasional /hpf      Bacteria, UA Moderate /hpf     Urine culture [[id number]] Collected: 09/29/22 1127    Lab Status:  In process Specimen: Urine, Clean Catch Updated: 09/29/22 1207    Comprehensive metabolic panel [[id number]] Collected: 09/29/22 1119    Lab Status: Final result Specimen: Blood from Arm, Right Updated: 09/29/22 1156     Sodium 135 mmol/L      Potassium 4 1 mmol/L      Chloride 99 mmol/L      CO2 29 mmol/L      ANION GAP 7 mmol/L      BUN 5 mg/dL      Creatinine 0 65 mg/dL      Glucose 106 mg/dL      Calcium 8 8 mg/dL      AST 23 U/L      ALT 16 U/L Alkaline Phosphatase 81 U/L      Total Protein 8 2 g/dL      Albumin 3 5 g/dL      Total Bilirubin 0 22 mg/dL      eGFR 122 ml/min/1 73sq m     Narrative:      [place] guidelines for Chronic Kidney Disease (CKD):     Stage 1 with normal or high GFR (GFR > 90 mL/min/1 73 square meters)    Stage 2 Mild CKD (GFR = 60-89 mL/min/1 73 square meters)    Stage 3A Moderate CKD (GFR = 45-59 mL/min/1 73 square meters)    Stage 3B Moderate CKD (GFR = 30-44 mL/min/1 73 square meters)    Stage 4 Severe CKD (GFR = 15-29 mL/min/1 73 square meters)    Stage 5 End Stage CKD (GFR <15 mL/min/1 73 square meters)  Note: GFR calculation is accurate only with a steady state creatinine    Lipase [[id number]]  (Normal) Collected: 09/29/22 1119    Lab Status: Final result Specimen: Blood from Arm, Right Updated: 09/29/22 1156     Lipase 80 u/L     CBC and differential [[id number]]  (Abnormal) Collected: 09/29/22 1119    Lab Status: Final result Specimen: Blood from Arm, Right Updated: 09/29/22 1137     WBC 8 69 Thousand/uL      RBC 5 03 Million/uL      Hemoglobin 14 0 g/dL      Hematocrit 43 3 %      MCV 86 fL      MCH 27 8 pg      MCHC 32 3 g/dL      RDW 13 3 %      MPV 10 9 fL      Platelets 703 Thousands/uL      nRBC 0 /100 WBCs      Neutrophils Relative 82 %      Immat GRANS % 1 %      Lymphocytes Relative 11 %      Monocytes Relative 6 %      Eosinophils Relative 0 %      Basophils Relative 0 %      Neutrophils Absolute 7 13 Thousands/µL      Immature Grans Absolute 0 05 Thousand/uL      Lymphocytes Absolute 0 93 Thousands/µL      Monocytes Absolute 0 55 Thousand/µL      Eosinophils Absolute 0 00 Thousand/µL      Basophils Absolute 0 03 Thousands/µL     POCT pregnancy, urine [[id number]]  (Normal) Resulted: 09/29/22 1136    Lab Status: Final result Updated: 09/29/22 1137     EXT PREG TEST UR (Ref: Negative) NEGATIVE     Control VALID    Urine Macroscopic, POC [[id number]]  (Abnormal) Collected: 09/29/22 1127 Lab Status: Final result Specimen: Urine Updated: 09/29/22 1128     Color, UA Yellow     Clarity, UA Clear     pH, UA 6 5     Leukocytes, UA Moderate     Nitrite, UA Negative     Protein, UA Trace mg/dl      Glucose, UA Negative mg/dl      Ketones, UA Trace mg/dl      Urobilinogen, UA 0 2 E U /dl      Bilirubin, UA Negative     Occult Blood, UA Moderate     Specific Desmet, UA 1 020    Narrative:      CLINITEK RESULT                 XR chest 1 view portable   ED Interpretation by Bettie Romero PA-C (09/29 1330)   No acute findings seen by me at this time  Final Result by Eloise Vazquez MD (09/29 7984)      No acute cardiopulmonary disease  Workstation performed: [id number]         CT abdomen pelvis with contrast   Final Result by Angelita Laura MD (09/29 1317)      No appendicitis, obstructive uropathy or signs of cholecystitis in this patient with right sided pain  Numerous small scattered mesenteric lymph nodes are likely present on a reactive basis  Exophytic right fundal fibroid                    Workstation performed: [id number]                    Procedures  ECG 12 Lead Documentation Only    Date/Time: 9/29/2022 12:10 PM  Performed by: Bettie Romero PA-C  Authorized by: Bettie Romero PA-C     Indications / Diagnosis:  Chest pain  ECG reviewed by me, the ED Provider: yes (Also reviewed by Dr Swapna Cristina)    Patient location:  ED  Interpretation:     Interpretation: normal    Rate:     ECG rate:  91    ECG rate assessment: normal    Rhythm:     Rhythm: sinus rhythm    Ectopy:     Ectopy: none    QRS:     QRS intervals:  Normal  ST segments:     ST segments:  Normal  T waves:     T waves: inverted      Inverted:  AVR, V1 and V2    ECG 12 Lead Documentation Only    Date/Time: 9/29/2022 3:00 PM  Performed by: Bettie Romero PA-C  Authorized by: Bettie Romero PA-C     Indications / Diagnosis:  Delta  ECG reviewed by me, the ED Provider: yes (Also reviewed by Dr Portia Quevedo)    Patient location:  ED  Previous ECG:     Previous ECG:  Compared to current    Similarity:  No change  Interpretation:     Interpretation: abnormal    Rate:     ECG rate:  92    ECG rate assessment: normal    Rhythm:     Rhythm: sinus rhythm    Ectopy:     Ectopy: none    QRS:     QRS intervals:  Normal  ST segments:     ST segments:  Normal  T waves:     T waves: inverted      Inverted:  AVR, V1 and V2             ED Course  ED Course as of 09/29/22 1520   Thu Sep 29, 2022   1132 Urine Macroscopic, POC(!)                               SBIRT 22yo+    Flowsheet Row Most Recent Value   SBIRT (23 yo +)    In order to provide better care to our patients, we are screening all of our patients for alcohol and drug use  Would it be okay to ask you these screening questions? No Filed at: 09/29/2022 1457                    MDM  Number of Diagnoses or Management Options  Acute viral syndrome: new and requires workup  Fibroid: new and requires workup  Gastroenteritis: new and requires workup  UTI (urinary tract infection): new and requires workup  Diagnosis management comments: DDX consists of but not limited to: viral syndrome, covid, appendicitis, abdominal pathology    Will check labs and imaging  1440 - Informed patient of lab and imaging findings  Patient reporting improvement in symptoms  Awaiting delta troponin and EKG  1515 - Patient signed out to [facility] awaiting delta troponin  Patient stable         Amount and/or Complexity of Data Reviewed  Clinical lab tests: ordered and reviewed  Tests in the radiology section of CPT®: ordered and reviewed  Independent visualization of images, tracings, or specimens: yes    Patient Progress  Patient progress: stable      Disposition  Final diagnoses:   Acute viral syndrome   Gastroenteritis   UTI (urinary tract infection)   Fibroid     Time reflects when diagnosis was documented in both MDM as applicable and the Disposition within this note Time User Action Codes Description Comment    9/29/2022  3:07 PM Alejandra Dung A Add [B34 9] Acute viral syndrome     9/29/2022  3:07 PM Alejandra Dung A Add [R11 2] Nausea and vomiting     9/29/2022  3:07 PM Alejandra Dung A Remove [R11 2] Nausea and vomiting     9/29/2022  3:07 PM Alejandra Dung A Add [K52 9] Gastroenteritis     9/29/2022  3:07 PM Alejandra Dung A Add [N39 0] UTI (urinary tract infection)     9/29/2022  3:08 PM Alejandra Dung A Add [D21 9] Fibroid       ED Disposition     None      Follow-up Information     Follow up With Specialties Details Why Contact Info Additional [address] [facility] Schedule an appointment as soon as possible for a visit  As needed [address], [address] [facility] [address]  [address], [address], [address] [place], [place], [address] [place] [address] Obstetrics and Gynecology Schedule an appointment as soon as possible for a visit   [address] [address]  [address], [address], [address] [place], [place], [address] [phone]          Patient's Medications   Discharge Prescriptions    CEPHALEXIN (KEFLEX) 500 MG CAPSULE    Take 1 capsule (500 mg total) by mouth 2 (two) times a day for 10 days       Start Date: 9/29/2022 End Date: 10/9/2022       Order Dose: 500 mg       Quantity: 20 capsule    Refills: 0    DICYCLOMINE (BENTYL) 20 MG TABLET    Take 1 tablet (20 mg total) by mouth every 6 (six) hours       Start Date: 9/29/2022 End Date: --       Order Dose: 20 mg       Quantity: 30 tablet    Refills: 0    ONDANSETRON (ZOFRAN-ODT) 4 MG DISINTEGRATING TABLET    Take 1 tablet (4 mg total) by mouth every 8 (eight) hours as needed for nausea or vomiting       Start Date: 9/29/2022 End Date: -- Order Dose: 4 mg       Quantity: 12 tablet    Refills: 0       No discharge procedures on file      PDMP Review     None          ED Provider  Electronically Signed by           Wellington Olivier PA-C  09/29/22 6852

## 2022-09-29 NOTE — Clinical Note
Oziel Rodriguez was seen and treated in our emergency department on 9/29/2022  No restrictions            Diagnosis:     Charisma Harrington  may return to work on return date  She may return on this date: 10/03/2022         If you have any questions or concerns, please don't hesitate to call        Kevin Abraham RN    ______________________________           _______________          _______________  Hospital Representative                              Date                                Time

## 2022-09-30 LAB
ATRIAL RATE: 91 BPM
ATRIAL RATE: 92 BPM
P AXIS: 68 DEGREES
P AXIS: 69 DEGREES
PR INTERVAL: 146 MS
PR INTERVAL: 158 MS
QRS AXIS: 45 DEGREES
QRS AXIS: 45 DEGREES
QRSD INTERVAL: 68 MS
QRSD INTERVAL: 70 MS
QT INTERVAL: 326 MS
QT INTERVAL: 334 MS
QTC INTERVAL: 400 MS
QTC INTERVAL: 413 MS
T WAVE AXIS: 53 DEGREES
T WAVE AXIS: 54 DEGREES
VENTRICULAR RATE: 91 BPM
VENTRICULAR RATE: 92 BPM

## 2022-09-30 PROCEDURE — 93010 ELECTROCARDIOGRAM REPORT: CPT

## 2022-10-01 LAB — BACTERIA UR CULT: NORMAL

## 2023-01-16 ENCOUNTER — OFFICE VISIT (OUTPATIENT)
Dept: DENTISTRY | Facility: CLINIC | Age: 28
End: 2023-01-16

## 2023-01-16 VITALS — TEMPERATURE: 99.5 F | DIASTOLIC BLOOD PRESSURE: 77 MMHG | HEART RATE: 89 BPM | SYSTOLIC BLOOD PRESSURE: 122 MMHG

## 2023-01-16 DIAGNOSIS — K08.89 TOOTH PAIN: Primary | ICD-10-CM

## 2023-01-16 NOTE — PROGRESS NOTES
Emergency: Tooth Pain    Rhiannon Laura is a 25yo female that presented to [facility] for tooth pain  PMHR, no significant findings include:    ASA II  Pain = none on presentation  BP = 122/77mmHg  Translation = none       Clinical:     Hx: pain started 3wks ago  Keeps pt up at night? No  Intensity? Dull, throbing  Cold/hot sensitive? Yes  7/10 no lingering  Spontaneous pain? No  Triggered by chewing and thermal  Duration? N/A  Mobility? N/A             PD? 2-3mm, WNL           Other clinical findings: pt has multiple amalgam fillings pt reports from at least 10 yrs ago     Pulp Test                Tooth    Cold   Percussion  #14  10/10 pain, 7sec linger        (-)  #19  7/10 pain, 5 sec          (-)    Radiographic:      #14 - large amalgam fillings close to pulp  Do not seem to have open margins  #19 - amalgam filling  Do not seem to have open margins  Dg:   #14 -  reversible pulpitis, normal periapex  #15 -  Open margins on amalgam fillings  #19 -  reversible pulpitis, normal periapex       Plan:  DT: adj occlusion #15  NV: Resins    RX: None     NV: #15-OB + f/u pain ULQ, LLQ    NNV: Comp

## 2023-04-06 ENCOUNTER — OFFICE VISIT (OUTPATIENT)
Dept: DENTISTRY | Facility: CLINIC | Age: 28
End: 2023-04-06

## 2023-04-06 VITALS — TEMPERATURE: 97.8 F | DIASTOLIC BLOOD PRESSURE: 75 MMHG | HEART RATE: 80 BPM | SYSTOLIC BLOOD PRESSURE: 118 MMHG

## 2023-04-06 DIAGNOSIS — Z01.20 ENCOUNTER FOR DENTAL EXAMINATION: Primary | ICD-10-CM

## 2023-04-06 NOTE — DENTAL PROCEDURE DETAILS
Incomplete Comprehensive Exam (Prophy + clinical exam only)    Doris Angelo is a 25yo female who presents to [facility] for f/u on left side pain/sensitivity and smooth #15-OB  However, pt says her left side isn't hurting anymore  Clinical intraoral examination did not reveal any evidence of caries  ASA I  Pain = pt denies    Decided to do cleaning today  Performed cleaning  Erica on L/F of lower anteriors and linguals of molars  Discussed focus areas with pt         NV: complete comp (charting existing + XR + probe)

## 2023-08-14 ENCOUNTER — OFFICE VISIT (OUTPATIENT)
Dept: DENTISTRY | Facility: CLINIC | Age: 28
End: 2023-08-14

## 2023-08-14 VITALS — HEART RATE: 86 BPM | DIASTOLIC BLOOD PRESSURE: 73 MMHG | SYSTOLIC BLOOD PRESSURE: 121 MMHG

## 2023-08-14 DIAGNOSIS — Z01.20 ENCOUNTER FOR DENTAL EXAMINATION: Primary | ICD-10-CM

## 2023-08-14 PROCEDURE — D0210 INTRAORAL - COMPLETE SERIES OF RADIOGRAPHIC IMAGES: HCPCS

## 2023-08-14 PROCEDURE — D0150 COMPREHENSIVE ORAL EVALUATION - NEW OR ESTABLISHED PATIENT: HCPCS

## 2023-08-14 NOTE — DENTAL PROCEDURE DETAILS
Darcy Valle presents for a Comprehensive exam. Verbal consent for treatment given in addition to the forms. Reviewed health history - Patient is ASA I  Consents signed: Yes     Perio: Normal, Slight bleeding, and Gingivitis  Pain Scale: 0  Caries Assessment: Low  Radiographs: Complete mouth series     Oral Hygiene instruction reviewed and given. Recommended Hygiene recall visits with the [facility]. Patient presents for Comp exam & FMX, she was seen by Dr. Collette Houston 4/23 and prophy & Limited exam  completed not a comp exam. I completed Comp exam, FMX & FMP, I scaled mesial #32 sub calculus. No chief complaints today. I reviewed OH with patient. Treatment Plan:  1. Infection control: referred for   2. Periodontal therapy: adult prophy 6M ( was completed prior to Comp exam (Limited by Dr. Collette Houston)   3. Caries control: as charted no caries today   4. Occlusal evaluation:   5. Case Difficulty Type 1  Prognosis is Good.   Referrals needed: No  Next Visit:  [facility]   Exam by Dr. Sil Chapin

## 2024-03-10 ENCOUNTER — OFFICE VISIT (OUTPATIENT)
Dept: URGENT CARE | Age: 29
End: 2024-03-10
Payer: COMMERCIAL

## 2024-03-10 VITALS
RESPIRATION RATE: 20 BRPM | TEMPERATURE: 99.6 F | HEART RATE: 87 BPM | SYSTOLIC BLOOD PRESSURE: 122 MMHG | DIASTOLIC BLOOD PRESSURE: 80 MMHG | OXYGEN SATURATION: 99 %

## 2024-03-10 DIAGNOSIS — R11.0 NAUSEA: ICD-10-CM

## 2024-03-10 DIAGNOSIS — R05.1 ACUTE COUGH: Primary | ICD-10-CM

## 2024-03-10 DIAGNOSIS — R06.2 WHEEZING: ICD-10-CM

## 2024-03-10 LAB
SL AMB  POCT GLUCOSE, UA: ABNORMAL
SL AMB LEUKOCYTE ESTERASE,UA: ABNORMAL
SL AMB POCT BILIRUBIN,UA: ABNORMAL
SL AMB POCT BLOOD,UA: ABNORMAL
SL AMB POCT CLARITY,UA: CLEAR
SL AMB POCT COLOR,UA: YELLOW
SL AMB POCT KETONES,UA: 40
SL AMB POCT NITRITE,UA: ABNORMAL
SL AMB POCT PH,UA: 6
SL AMB POCT SPECIFIC GRAVITY,UA: 1.01
SL AMB POCT URINE HCG: NEGATIVE
SL AMB POCT URINE PROTEIN: ABNORMAL
SL AMB POCT UROBILINOGEN: 2

## 2024-03-10 PROCEDURE — 81025 URINE PREGNANCY TEST: CPT

## 2024-03-10 PROCEDURE — 81002 URINALYSIS NONAUTO W/O SCOPE: CPT

## 2024-03-10 PROCEDURE — 99213 OFFICE O/P EST LOW 20 MIN: CPT

## 2024-03-10 RX ORDER — ALBUTEROL SULFATE 90 UG/1
2 AEROSOL, METERED RESPIRATORY (INHALATION) EVERY 6 HOURS PRN
Qty: 6.7 G | Refills: 0 | Status: SHIPPED | OUTPATIENT
Start: 2024-03-10

## 2024-03-10 RX ORDER — BENZONATATE 100 MG/1
100 CAPSULE ORAL 3 TIMES DAILY PRN
Qty: 20 CAPSULE | Refills: 0 | Status: SHIPPED | OUTPATIENT
Start: 2024-03-10

## 2024-03-10 RX ORDER — ONDANSETRON 4 MG/1
4 TABLET, FILM COATED ORAL EVERY 8 HOURS PRN
Qty: 20 TABLET | Refills: 0 | Status: SHIPPED | OUTPATIENT
Start: 2024-03-10

## 2024-03-10 NOTE — PATIENT INSTRUCTIONS
Use benzonatate as directed as needed for cough.   Use Zofran as prescribed.   Use albuterol as directed, as needed for shortness of breath and wheezing.  Hydration and rest.  Acetaminophen and ibuprofen for pain relief and fever reduction.   PCP follow up in 3-5 days.   Go to an emergency department if difficulty breathing occurs or if symptoms worsen.

## 2024-03-10 NOTE — PROGRESS NOTES
[facility]        NAME: Yudi Girard is a 28 y.o. female  : 1995    MRN: [id number]  DATE: March 10, 2024  TIME: 3:40 PM      Assessment and Plan     Acute cough [R05.1]  1. Acute cough  benzonatate (TESSALON PERLES) 100 mg capsule    albuterol (Proventil HFA) 90 mcg/act inhaler      2. Wheezing  albuterol (Proventil HFA) 90 mcg/act inhaler      3. Nausea  ondansetron (ZOFRAN) 4 mg tablet    POCT urine HCG    POCT urine dip          Poct hcg negative.  Urine dip shows small leukocytes, no nitrates. Normal PH and SG.     Patient Instructions   Use benzonatate as directed as needed for cough.   Use Zofran as prescribed.   Use albuterol as directed, as needed for shortness of breath and wheezing.  Hydration and rest.  Acetaminophen and ibuprofen for pain relief and fever reduction.   PCP follow up in 3-5 days.   Go to an emergency department if difficulty breathing occurs or if symptoms worsen.    Chief Complaint     Chief Complaint   Patient presents with    Cold Like Symptoms     Pt presents with cold like symptoms that started on Friday. Pt c/o a dry cough, body aches, JIMMY, a stomach ache, nausea, vomiting, chills, diarrhea, and SOB, congested with no mucus.          History of Present Illness     Patient is a 28-year-old female who presents with dry cough, congestion, body aches, nausea and diarrhea for 2 days. Unsure prengnacy- recently got IUD out. Denies asthma history but reports intermittent wheezing.         Review of Systems     Review of Systems   Constitutional:  Positive for chills.   HENT:  Positive for congestion.    Respiratory:  Positive for cough and wheezing.    Gastrointestinal:  Positive for abdominal pain, diarrhea and nausea. Negative for vomiting.   Musculoskeletal:  Positive for myalgias.   All other systems reviewed and are negative.        Current Medications       Current Outpatient Medications:     albuterol (Proventil HFA) 90 mcg/act inhaler, Inhale 2 puffs every 6  (six) hours as needed for wheezing, Disp: 6.7 g, Rfl: 0    benzonatate (TESSALON PERLES) 100 mg capsule, Take 1 capsule (100 mg total) by mouth 3 (three) times a day as needed for cough, Disp: 20 capsule, Rfl: 0    ondansetron (ZOFRAN) 4 mg tablet, Take 1 tablet (4 mg total) by mouth every 8 (eight) hours as needed for nausea or vomiting, Disp: 20 tablet, Rfl: 0    Current Allergies     Allergies as of 03/10/2024    (No Known Allergies)              The following portions of the patient's history were reviewed and updated as appropriate: allergies, current medications, past family history, past medical history, past social history, past surgical history and problem list.     History reviewed. No pertinent past medical history.    History reviewed. No pertinent surgical history.    History reviewed. No pertinent family history.      Medications have been verified.        Objective     /80 (BP Location: Right arm)   Pulse 87   Temp 99.6 °F (37.6 °C) (Tympanic)   Resp 20   SpO2 99%   No LMP recorded.         Physical Exam     Physical Exam  Vitals and nursing note reviewed.   Constitutional:       General: She is awake. She is not in acute distress.     Appearance: Normal appearance. She is not ill-appearing, toxic-appearing or diaphoretic.   HENT:      Right Ear: Tympanic membrane, ear canal and external ear normal.      Left Ear: Tympanic membrane, ear canal and external ear normal.      Nose: Congestion present.      Mouth/Throat:      Lips: Pink.      Mouth: Mucous membranes are moist.      Pharynx: Oropharynx is clear. Uvula midline. No oropharyngeal exudate or posterior oropharyngeal erythema.   Cardiovascular:      Rate and Rhythm: Normal rate.      Pulses: Normal pulses.      Heart sounds: Normal heart sounds, S1 normal and S2 normal.   Pulmonary:      Effort: Pulmonary effort is normal.      Breath sounds: Normal breath sounds and air entry.   Skin:     General: Skin is warm.      Capillary Refill:  Capillary refill takes less than 2 seconds.   Neurological:      Mental Status: She is alert.   Psychiatric:         Mood and Affect: Mood normal.         Behavior: Behavior normal.         Thought Content: Thought content normal.         Judgment: Judgment normal.

## 2024-03-10 NOTE — LETTER
March 10, 2024     Patient: Yudi Girard   YOB: 1995   Date of Visit: 3/10/2024       To Whom It May Concern:    It is my medical opinion that Yudi Girard may return to work on 3/13/24 if symptoms are improving.          Sincerely,        RACIEL Wilkins    CC: No Recipients

## 2024-05-04 ENCOUNTER — OFFICE VISIT (OUTPATIENT)
Dept: URGENT CARE | Age: 29
End: 2024-05-04
Payer: COMMERCIAL

## 2024-05-04 VITALS
OXYGEN SATURATION: 99 % | DIASTOLIC BLOOD PRESSURE: 62 MMHG | BODY MASS INDEX: 40.04 KG/M2 | WEIGHT: 226 LBS | RESPIRATION RATE: 18 BRPM | HEART RATE: 72 BPM | HEIGHT: 63 IN | TEMPERATURE: 98.6 F | SYSTOLIC BLOOD PRESSURE: 134 MMHG

## 2024-05-04 DIAGNOSIS — Z02.4 DRIVER'S PERMIT PHYSICAL EXAMINATION: Primary | ICD-10-CM

## 2024-05-04 NOTE — PROGRESS NOTES
"[facility]        NAME: Yudi Girard is a 29 y.o. female  : 1995    MRN: [id number]  DATE: May 4, 2024  TIME: 9:39 AM    /62 (BP Location: Left arm, Patient Position: Sitting)   Pulse 72   Temp 98.6 °F (37 °C) (Tympanic)   Resp 18   Ht 5' 3\" (1.6 m)   Wt 103 kg (226 lb)   SpO2 99%   BMI 40.03 kg/m²     Assessment and Plan   's permit physical examination [Z02.4]  1. 's permit physical examination              Patient Instructions       Follow up with PCP in 3-5 days.  Proceed to  ER if symptoms worsen.    Chief Complaint     Chief Complaint   Patient presents with    Annual Exam     Drivers License exam. No complaints or concerns voiced         History of Present Illness       Pt with [place]  license physical, pt with no complaints, pt answers negative to all questions on form         Review of Systems   Review of Systems   Constitutional: Negative.    HENT: Negative.     Eyes: Negative.    Respiratory: Negative.     Cardiovascular: Negative.    Gastrointestinal: Negative.    Endocrine: Negative.    Genitourinary: Negative.    Musculoskeletal: Negative.    Skin: Negative.    Allergic/Immunologic: Negative.    Neurological: Negative.    Hematological: Negative.    Psychiatric/Behavioral: Negative.     All other systems reviewed and are negative.        Current Medications       Current Outpatient Medications:     albuterol (Proventil HFA) 90 mcg/act inhaler, Inhale 2 puffs every 6 (six) hours as needed for wheezing (Patient not taking: Reported on 2024), Disp: 6.7 g, Rfl: 0    benzonatate (TESSALON PERLES) 100 mg capsule, Take 1 capsule (100 mg total) by mouth 3 (three) times a day as needed for cough (Patient not taking: Reported on 2024), Disp: 20 capsule, Rfl: 0    ondansetron (ZOFRAN) 4 mg tablet, Take 1 tablet (4 mg total) by mouth every 8 (eight) hours as needed for nausea or vomiting (Patient not taking: Reported on 2024), Disp: 20 tablet, Rfl: " "0    Current Allergies     Allergies as of 05/04/2024    (No Known Allergies)            The following portions of the patient's history were reviewed and updated as appropriate: allergies, current medications, past family history, past medical history, past social history, past surgical history and problem list.     History reviewed. No pertinent past medical history.    History reviewed. No pertinent surgical history.    History reviewed. No pertinent family history.      Medications have been verified.        Objective   /62 (BP Location: Left arm, Patient Position: Sitting)   Pulse 72   Temp 98.6 °F (37 °C) (Tympanic)   Resp 18   Ht 5' 3\" (1.6 m)   Wt 103 kg (226 lb)   SpO2 99%   BMI 40.03 kg/m²        Physical Exam     Physical Exam  Vitals and nursing note reviewed.   Constitutional:       Appearance: Normal appearance. She is normal weight.   HENT:      Head: Normocephalic and atraumatic.      Right Ear: Tympanic membrane, ear canal and external ear normal.      Left Ear: Tympanic membrane, ear canal and external ear normal.      Nose: Nose normal.      Mouth/Throat:      Mouth: Mucous membranes are moist.      Pharynx: Oropharynx is clear.   Eyes:      Extraocular Movements: Extraocular movements intact.      Conjunctiva/sclera: Conjunctivae normal.      Pupils: Pupils are equal, round, and reactive to light.   Cardiovascular:      Rate and Rhythm: Normal rate and regular rhythm.      Pulses: Normal pulses.      Heart sounds: Normal heart sounds.   Pulmonary:      Effort: Pulmonary effort is normal.      Breath sounds: Normal breath sounds.   Abdominal:      General: Abdomen is flat. Bowel sounds are normal.      Palpations: Abdomen is soft.   Musculoskeletal:         General: Normal range of motion.      Cervical back: Normal range of motion and neck supple.   Skin:     General: Skin is warm.      Capillary Refill: Capillary refill takes less than 2 seconds.   Neurological:      Mental Status: " She is alert and oriented to person, place, and time.   Psychiatric:         Behavior: Behavior normal.